# Patient Record
Sex: MALE | Race: WHITE | URBAN - METROPOLITAN AREA
[De-identification: names, ages, dates, MRNs, and addresses within clinical notes are randomized per-mention and may not be internally consistent; named-entity substitution may affect disease eponyms.]

---

## 2017-01-11 ENCOUNTER — TELEPHONE (OUTPATIENT)
Dept: SURGERY | Facility: CLINIC | Age: 47
End: 2017-01-11

## 2017-01-11 NOTE — TELEPHONE ENCOUNTER
Patient requesting a call back from RN, states has questions regarding a surgery he just had in December, would not give any other information, please call.  Thank you

## 2017-01-11 NOTE — TELEPHONE ENCOUNTER
Patient contacted, had a question about \"removal of lipoma of the cord\" during inguinal hernia repair surgery in December. Advised patient that a lipoma is a benign, fatty mass which can increase in size.   Patient verbalized understanding, will call sandip

## 2017-01-23 PROBLEM — M25.522 ELBOW PAIN, LEFT: Status: ACTIVE | Noted: 2017-01-23

## 2017-01-23 PROBLEM — M77.12 LATERAL EPICONDYLITIS OF LEFT ELBOW: Status: ACTIVE | Noted: 2017-01-23

## 2017-05-03 ENCOUNTER — TELEPHONE (OUTPATIENT)
Dept: SURGERY | Facility: CLINIC | Age: 47
End: 2017-05-03

## 2017-05-03 NOTE — TELEPHONE ENCOUNTER
Pt states he had sx in: 12/2016 and is experiencing: possible hernia pain, pain level 3/10 and a lump on incision w/puss and some redness and swelling on incision site. No fever.  Please advise, thank you. (pharmacy confirmed) (unable to connect)

## 2017-05-03 NOTE — TELEPHONE ENCOUNTER
V/O from Dr. Karma Pan ok to refill proair #1 R#5. Rx sent to [facility]. Message left informing pt of this.

## 2017-05-03 NOTE — TELEPHONE ENCOUNTER
Contacted patient. States 2 weeks ago experienced a bump at end of scar, a couple days ago turned into a \"amaral\" and popped, drained yesterday. Also experiencing twinges similar to before procedure in 12/2016. Advised patient Dr. Max Jacobsen should assess.  P

## 2017-05-05 ENCOUNTER — OFFICE VISIT (OUTPATIENT)
Dept: SURGERY | Facility: CLINIC | Age: 47
End: 2017-05-05

## 2017-05-05 DIAGNOSIS — R10.32 GROIN PAIN, LEFT: Primary | ICD-10-CM

## 2017-05-05 DIAGNOSIS — S76.212A GROIN STRAIN, LEFT, INITIAL ENCOUNTER: ICD-10-CM

## 2017-05-05 PROCEDURE — 99212 OFFICE O/P EST SF 10 MIN: CPT | Performed by: SURGERY

## 2017-05-05 PROCEDURE — 99214 OFFICE O/P EST MOD 30 MIN: CPT | Performed by: SURGERY
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
## 2017-05-07 NOTE — PATIENT INSTRUCTIONS
Assessment   Groin pain, left  (primary encounter diagnosis)  Groin strain, left, initial encounter      Plan                 Patient has developed a new pain at the Left Inguinal Area, site of the previous Left Inguinal Hernia Repair.  Was doing well bu up

## 2017-06-06 ENCOUNTER — TELEPHONE (OUTPATIENT)
Dept: SURGERY | Facility: CLINIC | Age: 47
End: 2017-06-06

## 2017-06-29 ENCOUNTER — TELEPHONE (OUTPATIENT)
Dept: SURGERY | Facility: CLINIC | Age: 47
End: 2017-06-29

## 2017-06-29 NOTE — TELEPHONE ENCOUNTER
Pt states he is still experiencing pain after sx on 12/20/16, pt is concerned. Pls advise thank you.

## 2017-06-29 NOTE — TELEPHONE ENCOUNTER
Contacted patient. S/P Repair of LIH with insertion of PreFix Marlex plug and mesh and excision lipoma of the cord on 12/20/2016.  Patient still experiencing pain, states it is better however he needs to fully return to his job that requires strenuous activ

## 2017-06-30 ENCOUNTER — OFFICE VISIT (OUTPATIENT)
Dept: SURGERY | Facility: CLINIC | Age: 47
End: 2017-06-30

## 2017-06-30 VITALS
BODY MASS INDEX: 21.19 KG/M2 | WEIGHT: 135 LBS | SYSTOLIC BLOOD PRESSURE: 110 MMHG | HEIGHT: 67 IN | TEMPERATURE: 98 F | DIASTOLIC BLOOD PRESSURE: 72 MMHG

## 2017-06-30 DIAGNOSIS — R10.32 LEFT GROIN PAIN: Primary | ICD-10-CM

## 2017-06-30 PROCEDURE — 99214 OFFICE O/P EST MOD 30 MIN: CPT | Performed by: SURGERY

## 2017-06-30 PROCEDURE — 99212 OFFICE O/P EST SF 10 MIN: CPT | Performed by: SURGERY

## 2017-07-03 NOTE — PROGRESS NOTES
Follow Up Visit Note       Active Problems   Left groin pain  (primary encounter diagnosis)  Chief Complaint: Patient presents with:  Post-Op: patient had hernia repair in Dec of 2016 , was seen in May for a f'u complaining of pain at the incision site ,le Inguinodynia. Musculoskeletal: He exhibits no edema, tenderness or deformity. Neurological: He is alert and oriented to person, place, and time. He has normal reflexes. He displays normal reflexes. No cranial nerve deficit.  He exhibits normal muscle

## 2017-07-03 NOTE — PATIENT INSTRUCTIONS
Assessment   Left groin pain  (primary encounter diagnosis)      Plan                 Pain/Inguinodynea at Left Inguinal Area, S/P Left Inguinal Hernia Repair December 2016. Pain is not constant. And the area described in a vague form.  Recommended Tylenol

## 2017-08-11 ENCOUNTER — TELEPHONE (OUTPATIENT)
Dept: SURGERY | Facility: CLINIC | Age: 47
End: 2017-08-11

## 2017-08-11 NOTE — TELEPHONE ENCOUNTER
Contacted patient's wife, Adriel Encarnacion. Advised her to alternate motrin and advil, start with 1 tablet/caplet every 6 hours, may increase to 2 if needed. Also instructed to use together with heat/ice-whichever feels better.  She verbalized understanding and will re

## 2017-09-08 RX ORDER — ALBUTEROL SULFATE 90 UG/1
AEROSOL, METERED RESPIRATORY (INHALATION)
Qty: 1 INHALER | Refills: 0 | Status: SHIPPED | OUTPATIENT
Start: 2017-09-08 | End: 2017-11-09

## 2017-09-08 NOTE — TELEPHONE ENCOUNTER
Pt requesting refills for ProAir. Pt requesting 1 refill to go to local pharm, [facility], as he is running out. And then 90 day refills to go to [facility]. Pls call. Thank you.       Current Outpatient Prescriptions:  PROAIR  (90 Base) MCG/ACT I

## 2017-09-08 NOTE — TELEPHONE ENCOUNTER
Notified pt that 1 refill of ProAir was sent to [facility] will sign off on rx for ProAir to go to [facility] shortly. He verbalized understanding.

## 2017-09-09 RX ORDER — ALBUTEROL SULFATE 90 UG/1
AEROSOL, METERED RESPIRATORY (INHALATION)
Qty: 3 INHALER | Refills: 1 | Status: SHIPPED | OUTPATIENT
Start: 2017-09-09 | End: 2017-11-09

## 2017-10-02 PROBLEM — M25.522 LEFT ELBOW PAIN: Status: ACTIVE | Noted: 2017-01-23

## 2017-11-14 ENCOUNTER — TELEPHONE (OUTPATIENT)
Dept: PULMONOLOGY | Facility: CLINIC | Age: 47
End: 2017-11-14

## 2017-11-14 NOTE — TELEPHONE ENCOUNTER
Pt is requesting an Appt sooner than first available due to surgical clearance for elbow surgery.  Please call [phone] thank you

## 2017-11-15 NOTE — TELEPHONE ENCOUNTER
Appt made for tomorrow 10:00. Pt notified of time date and place of appt. Pt verbalized understanding.

## 2017-11-16 ENCOUNTER — OFFICE VISIT (OUTPATIENT)
Dept: PULMONOLOGY | Facility: CLINIC | Age: 47
End: 2017-11-16

## 2017-11-16 VITALS
HEART RATE: 60 BPM | DIASTOLIC BLOOD PRESSURE: 73 MMHG | SYSTOLIC BLOOD PRESSURE: 112 MMHG | WEIGHT: 146 LBS | BODY MASS INDEX: 23 KG/M2 | OXYGEN SATURATION: 98 % | RESPIRATION RATE: 16 BRPM

## 2017-11-16 DIAGNOSIS — M77.12 LATERAL EPICONDYLITIS OF LEFT ELBOW: Primary | ICD-10-CM

## 2017-11-16 PROCEDURE — 99213 OFFICE O/P EST LOW 20 MIN: CPT | Performed by: INTERNAL MEDICINE

## 2017-11-16 PROCEDURE — 99212 OFFICE O/P EST SF 10 MIN: CPT | Performed by: INTERNAL MEDICINE

## 2017-11-16 RX ORDER — ALBUTEROL SULFATE 90 UG/1
AEROSOL, METERED RESPIRATORY (INHALATION)
Qty: 3 INHALER | Refills: 3 | Status: SHIPPED | OUTPATIENT
Start: 2017-11-16 | End: 2018-04-12

## 2017-11-16 NOTE — PROGRESS NOTES
The patient is 25-year-old male comes who comes in now seeking clearance for a left elbow arthroscopy. Patient has ongoing discomfort at the site. He continues to smoke but otherwise is doing well.   He tells me that he had a negative cardiac evaluation a

## 2018-02-15 PROBLEM — M23.91 INTERNAL DERANGEMENT OF MULTIPLE SITES OF RIGHT KNEE: Status: ACTIVE | Noted: 2018-02-15

## 2018-02-15 PROBLEM — M23.92 INTERNAL DERANGEMENT OF LEFT KNEE: Status: ACTIVE | Noted: 2018-02-15

## 2018-10-09 NOTE — TELEPHONE ENCOUNTER
Current Outpatient Medications:  PROAIR  (90 Base) MCG/ACT Inhalation Aero Soln INHALE ONE PUFF BY MOUTH EVERY FOUR TO SIX HOURS AS NEEDED Disp: 25.5 g Rfl: 1     Refill

## 2018-10-10 RX ORDER — ALBUTEROL SULFATE 90 UG/1
AEROSOL, METERED RESPIRATORY (INHALATION)
Qty: 25.5 G | Refills: 1 | Status: SHIPPED | OUTPATIENT
Start: 2018-10-10 | End: 2019-01-09

## 2019-01-09 RX ORDER — ALBUTEROL SULFATE 90 UG/1
AEROSOL, METERED RESPIRATORY (INHALATION)
Qty: 25.5 G | Refills: 1 | Status: SHIPPED | OUTPATIENT
Start: 2019-01-09 | End: 2019-09-15

## 2019-01-09 NOTE — TELEPHONE ENCOUNTER
Current Outpatient Medications:  Albuterol Sulfate HFA (PROAIR HFA) 108 (90 Base) MCG/ACT Inhalation Aero Soln INHALE ONE PUFF BY MOUTH EVERY FOUR TO SIX HOURS AS NEEDED Disp: 25.5 g Rfl: 1     REFILL

## 2019-09-16 RX ORDER — ALBUTEROL SULFATE 90 UG/1
AEROSOL, METERED RESPIRATORY (INHALATION)
Qty: 25.5 G | Refills: 3 | Status: SHIPPED | OUTPATIENT
Start: 2019-09-16 | End: 2020-03-06

## 2020-03-06 RX ORDER — ALBUTEROL SULFATE 90 UG/1
AEROSOL, METERED RESPIRATORY (INHALATION)
Qty: 25.5 G | Refills: 3 | Status: SHIPPED | OUTPATIENT
Start: 2020-03-06 | End: 2020-09-11

## 2020-03-06 NOTE — TELEPHONE ENCOUNTER
Current Outpatient Medications   Medication Sig Dispense Refill   • Albuterol Sulfate HFA (PROAIR HFA) 108 (90 Base) MCG/ACT Inhalation Aero Soln USE 1 INHALATION EVERY 4 TO 6 HOURS AS NEEDED 25.5 g 3

## 2020-03-06 NOTE — TELEPHONE ENCOUNTER
LR 9/16/19  LOV 11/16/17  Pt needs f/u appt.   Lindsay Smith is aware that we received request for refill of albuterol sulfate HFA, last appt w/ MD 11/16/17, he should make f/u appt, & refill request for albuterol sulfate HFA will be sent to MD. He accepted appt

## 2020-03-07 NOTE — TELEPHONE ENCOUNTER
Telephone rx w/ readback rcvd from Dr. Deandra Paige to refill pt's albuterol sulfate  mcg/act inh aero soln. Rx sent to pharmacy.

## 2020-09-02 ENCOUNTER — TELEPHONE (OUTPATIENT)
Dept: PULMONOLOGY | Facility: CLINIC | Age: 50
End: 2020-09-02

## 2020-09-02 RX ORDER — MELOXICAM 15 MG/1
15 TABLET ORAL
Qty: 30 TABLET | Refills: 3 | Status: SHIPPED | OUTPATIENT
Start: 2020-09-02 | End: 2021-06-21

## 2020-09-02 NOTE — TELEPHONE ENCOUNTER
LOV 11/16/17, cancelled appt 9/3/20. Requesting meloxicam refill. Dr. Ada Martinez, please advise. 30 day supply pended.

## 2020-09-02 NOTE — TELEPHONE ENCOUNTER
Pt had to cancel appt for tomorrow - wife states he cannot reschedule now due to work schedule - asking if pt can get refill of Meloxicam

## 2020-09-11 RX ORDER — ALBUTEROL SULFATE 90 UG/1
AEROSOL, METERED RESPIRATORY (INHALATION)
Qty: 25.5 G | Refills: 3 | Status: SHIPPED | OUTPATIENT
Start: 2020-09-11 | End: 2021-09-07

## 2020-09-11 NOTE — TELEPHONE ENCOUNTER
LOV 11/16/2017  Last refill 03/06/2020   Routed to [facility] for sign off

## 2021-06-18 NOTE — TELEPHONE ENCOUNTER
Budd Clonts checking status on refill request.  Please call - patient is completely out of meds. Thank you.

## 2021-06-21 RX ORDER — MELOXICAM 15 MG/1
TABLET ORAL
Qty: 30 TABLET | Refills: 2 | Status: SHIPPED | OUTPATIENT
Start: 2021-06-21 | End: 2021-10-13

## 2021-06-21 NOTE — TELEPHONE ENCOUNTER
LOV 11/16/17  Spoke with patient's wife, says he's working nights until November so can't come to appt.  Explained that patient needs appt for refills, scheduled appt for 11/4/21

## 2021-09-05 ENCOUNTER — TELEPHONE (OUTPATIENT)
Dept: PULMONOLOGY | Facility: CLINIC | Age: 51
End: 2021-09-05

## 2021-09-07 RX ORDER — ALBUTEROL SULFATE 90 UG/1
AEROSOL, METERED RESPIRATORY (INHALATION)
Qty: 1 EACH | Refills: 1 | Status: SHIPPED | OUTPATIENT
Start: 2021-09-07 | End: 2021-09-13

## 2021-09-07 NOTE — TELEPHONE ENCOUNTER
Wife called in to follow up on the refill request. Pt is out of the medication.  She is requesting for one of the medication to be sent to  [address] [place] [address]/[facility], [address] [place] [address] Selvin Real [phone], [phone] and the remaining medication sent t

## 2021-09-07 NOTE — TELEPHONE ENCOUNTER
Last refill 9/11/20  Last office visit 11/16/17    Dr. Andrea Nowak- Please review/sign pended refill request.

## 2021-09-07 NOTE — TELEPHONE ENCOUNTER
Attempted to speak to patient. Wife states patient is sleeping. Informed wife KALI not on file. Wife states patient to call back tomorrow.

## 2021-09-10 NOTE — TELEPHONE ENCOUNTER
Spoke with Henrry Simmons at [facility]. Henrry Simmons states patient picked up medication yesterday.

## 2021-09-13 RX ORDER — ALBUTEROL SULFATE 90 UG/1
AEROSOL, METERED RESPIRATORY (INHALATION)
Qty: 25.5 G | Refills: 0 | Status: SHIPPED | OUTPATIENT
Start: 2021-09-13 | End: 2021-12-15

## 2021-09-13 NOTE — TELEPHONE ENCOUNTER
LOV: 2017 (upcoming appointment 11/4/21)  Last refill: 9/7/21    Dr. Sugar Walls review and sign pended prescription if agreeable.

## 2021-10-13 RX ORDER — MELOXICAM 15 MG/1
TABLET ORAL
Qty: 30 TABLET | Refills: 0 | Status: SHIPPED | OUTPATIENT
Start: 2021-10-13 | End: 2021-11-22

## 2021-10-13 NOTE — TELEPHONE ENCOUNTER
LOV: 11/16/17  Upcoming appt: 11/4/21  LR: 6/21/21    Dr. Kentrell Garcia - Please review/sign pended refill

## 2021-11-04 ENCOUNTER — OFFICE VISIT (OUTPATIENT)
Dept: PULMONOLOGY | Facility: CLINIC | Age: 51
End: 2021-11-04
Payer: COMMERCIAL

## 2021-11-04 VITALS
SYSTOLIC BLOOD PRESSURE: 114 MMHG | OXYGEN SATURATION: 97 % | HEIGHT: 67 IN | DIASTOLIC BLOOD PRESSURE: 74 MMHG | BODY MASS INDEX: 22.7 KG/M2 | RESPIRATION RATE: 14 BRPM | HEART RATE: 60 BPM | WEIGHT: 144.63 LBS

## 2021-11-04 DIAGNOSIS — E78.5 DYSLIPIDEMIA: ICD-10-CM

## 2021-11-04 DIAGNOSIS — Z87.891 PERSONAL HISTORY OF TOBACCO USE, PRESENTING HAZARDS TO HEALTH: ICD-10-CM

## 2021-11-04 DIAGNOSIS — M77.12 LATERAL EPICONDYLITIS OF LEFT ELBOW: Primary | ICD-10-CM

## 2021-11-04 DIAGNOSIS — Z12.5 ENCOUNTER FOR PROSTATE CANCER SCREENING: ICD-10-CM

## 2021-11-04 PROCEDURE — 3078F DIAST BP <80 MM HG: CPT | Performed by: INTERNAL MEDICINE

## 2021-11-04 PROCEDURE — 99203 OFFICE O/P NEW LOW 30 MIN: CPT | Performed by: INTERNAL MEDICINE

## 2021-11-04 PROCEDURE — 3008F BODY MASS INDEX DOCD: CPT | Performed by: INTERNAL MEDICINE

## 2021-11-04 PROCEDURE — 3074F SYST BP LT 130 MM HG: CPT | Performed by: INTERNAL MEDICINE

## 2021-11-04 NOTE — PROGRESS NOTES
The patient is a 22-year-old male who Inocencio from prior evaluation comes in now for follow-up. He had surgery on his left elbow for the epicondylitis. He continues to smoke.   He has a 30-pack-year history of tobacco use and qualifies for low-dose CT s

## 2021-11-22 RX ORDER — MELOXICAM 15 MG/1
TABLET ORAL
Qty: 30 TABLET | Refills: 3 | Status: SHIPPED | OUTPATIENT
Start: 2021-11-22

## 2021-11-22 NOTE — TELEPHONE ENCOUNTER
LOV: 11/4/2021  Last refill: 10/13/21    Dr. Alina Casey review and sign pended prescription if agreeable.

## 2021-12-15 RX ORDER — ALBUTEROL SULFATE 90 UG/1
AEROSOL, METERED RESPIRATORY (INHALATION)
Qty: 25.5 G | Refills: 3 | Status: SHIPPED | OUTPATIENT
Start: 2021-12-15

## 2021-12-30 ENCOUNTER — TELEPHONE (OUTPATIENT)
Dept: PULMONOLOGY | Facility: CLINIC | Age: 51
End: 2021-12-30

## 2022-02-02 ENCOUNTER — TELEPHONE (OUTPATIENT)
Dept: PULMONOLOGY | Facility: CLINIC | Age: 52
End: 2022-02-02

## 2022-02-02 NOTE — TELEPHONE ENCOUNTER
Patient is overdue for CT lung low dose screening as ordered by Dr. Liliana Manriquez. First letter sent to patient.

## 2022-04-19 RX ORDER — MELOXICAM 15 MG/1
15 TABLET ORAL
Qty: 30 TABLET | Refills: 2 | Status: SHIPPED | OUTPATIENT
Start: 2022-04-19

## 2022-04-19 NOTE — TELEPHONE ENCOUNTER
Spoke with patient's wife, informed her that Dr. David Chavarria refilled patient's prescription. Wife verbalized understanding.

## 2022-06-03 ENCOUNTER — TELEPHONE (OUTPATIENT)
Dept: PULMONOLOGY | Facility: CLINIC | Age: 52
End: 2022-06-03

## 2022-06-03 RX ORDER — ALBUTEROL SULFATE 90 UG/1
1 AEROSOL, METERED RESPIRATORY (INHALATION) EVERY 4 HOURS PRN
Qty: 25.5 G | Refills: 3 | Status: SHIPPED | OUTPATIENT
Start: 2022-06-03

## 2022-06-03 NOTE — TELEPHONE ENCOUNTER
LOV: 11/04/2021  Last refill: 12/15/2021    Dr. Tena Kim review and sign rescue inhaler order if agreebale.

## 2022-09-06 RX ORDER — MELOXICAM 15 MG/1
15 TABLET ORAL
Qty: 30 TABLET | Refills: 2 | Status: SHIPPED | OUTPATIENT
Start: 2022-09-06

## 2022-12-12 RX ORDER — ALBUTEROL SULFATE 90 UG/1
AEROSOL, METERED RESPIRATORY (INHALATION)
Qty: 25.5 G | Refills: 3 | Status: SHIPPED | OUTPATIENT
Start: 2022-12-12

## 2022-12-12 NOTE — TELEPHONE ENCOUNTER
LOV: 11/4/2021  Last refill: 6/3/2022    Dr. Odilon rBagg review and sign pended prescription if agreeable.

## 2023-01-09 RX ORDER — MELOXICAM 15 MG/1
15 TABLET ORAL
Qty: 30 TABLET | Refills: 1 | Status: SHIPPED | OUTPATIENT
Start: 2023-01-09

## 2023-01-12 ENCOUNTER — OFFICE VISIT (OUTPATIENT)
Dept: PULMONOLOGY | Facility: CLINIC | Age: 53
End: 2023-01-12

## 2023-01-12 VITALS
HEIGHT: 67 IN | DIASTOLIC BLOOD PRESSURE: 84 MMHG | SYSTOLIC BLOOD PRESSURE: 141 MMHG | BODY MASS INDEX: 21.66 KG/M2 | OXYGEN SATURATION: 98 % | WEIGHT: 138 LBS | RESPIRATION RATE: 16 BRPM | HEART RATE: 63 BPM

## 2023-01-12 DIAGNOSIS — Z72.0 TOBACCO USE: Primary | ICD-10-CM

## 2023-01-12 PROCEDURE — 3008F BODY MASS INDEX DOCD: CPT | Performed by: INTERNAL MEDICINE

## 2023-01-12 PROCEDURE — 3079F DIAST BP 80-89 MM HG: CPT | Performed by: INTERNAL MEDICINE

## 2023-01-12 PROCEDURE — 3077F SYST BP >= 140 MM HG: CPT | Performed by: INTERNAL MEDICINE

## 2023-01-12 PROCEDURE — 99213 OFFICE O/P EST LOW 20 MIN: CPT | Performed by: INTERNAL MEDICINE

## 2023-01-12 NOTE — PROGRESS NOTES
The patient is a 75-year-old male who I know well from prior evaluation comes in now for follow-up. He quit smoking 4 days ago. He has not pursued colonoscopy or low-dose CT screening as previously advised but he is willing to get the low-dose CT scan now. His elbows occasionally manifest discomfort. Review of Systems:  Vision normal. Ear nose and throat normal. Bowel normal. Bladder function normal. No depression. No thyroid disease. No lymphatic system concerns. No rash. Muscles and joints unremarkable. No weight loss no weight gain. Physical Examination:  Vital signs normal. HEENT examination is unremarkable with pupils equal round and reactive to light and accommodation. Neck without adenopathy, thyromegaly, JVD nor bruit. Lungs clear to auscultation and percussion. Cardiac regular rate and rhythm no murmur. Abdomen nontender, without hepatosplenomegaly and no mass appreciable. Extremities and Musculoskeletal without clubbing cyanosis nor edema, and mobility acceptable. Neurologic grossly intact with symmetric tone and strength and reflex. Assessment and plan:  1. Tobacco use-quit 4 days ago. Ongoing tobacco abstinence. 2.  Low-dose CT screening program-we will renew order which had   3. Epicondylitis-better  4. General health- the patient should never drink and drive, always wear a safety belt, have a smoke detector and fire extiguisher in the house, avoid the use of candles in the home as they are the most common cause of housefire, and see me annually for complete examination. The patient had not followed up with PSA, colonoscopy, low-dose CT screening and I encouraged him to reconsider.

## 2023-02-15 ENCOUNTER — TELEPHONE (OUTPATIENT)
Dept: PULMONOLOGY | Facility: CLINIC | Age: 53
End: 2023-02-15

## 2023-02-15 DIAGNOSIS — R91.1 PULMONARY NODULE: Primary | ICD-10-CM

## 2023-02-15 NOTE — TELEPHONE ENCOUNTER
[facility]/Central Scheduling states that CT order has . Please update order in Epic - order  on 21.

## 2023-03-01 ENCOUNTER — HOSPITAL ENCOUNTER (OUTPATIENT)
Dept: CT IMAGING | Facility: HOSPITAL | Age: 53
Discharge: HOME | End: 2023-03-01
Attending: INTERNAL MEDICINE
Payer: COMMERCIAL

## 2023-03-01 DIAGNOSIS — R91.1 PULMONARY NODULE: ICD-10-CM

## 2023-03-01 PROCEDURE — 71271 CT THORAX LUNG CANCER SCR C-: CPT | Performed by: INTERNAL MEDICINE

## 2023-03-13 DIAGNOSIS — Z87.891 PERSONAL HISTORY OF TOBACCO USE, PRESENTING HAZARDS TO HEALTH: Primary | ICD-10-CM

## 2023-03-20 RX ORDER — MELOXICAM 15 MG/1
15 TABLET ORAL
Qty: 30 TABLET | Refills: 3 | Status: SHIPPED | OUTPATIENT
Start: 2023-03-20

## 2023-03-20 NOTE — TELEPHONE ENCOUNTER
LOV: 1/12/2023  Last refill: 1/9/2023    Dr. Spencer Hong review and sign pended prescription if agreeable.

## 2023-12-05 RX ORDER — ALBUTEROL SULFATE 90 UG/1
1 AEROSOL, METERED RESPIRATORY (INHALATION) EVERY 4 HOURS PRN
Qty: 25.5 G | Refills: 3 | Status: SHIPPED | OUTPATIENT
Start: 2023-12-05

## 2024-01-15 ENCOUNTER — OFFICE VISIT (OUTPATIENT)
Dept: PULMONOLOGY | Facility: CLINIC | Age: 54
End: 2024-01-15

## 2024-01-15 VITALS
OXYGEN SATURATION: 97 % | DIASTOLIC BLOOD PRESSURE: 78 MMHG | BODY MASS INDEX: 23.14 KG/M2 | SYSTOLIC BLOOD PRESSURE: 150 MMHG | HEIGHT: 66 IN | HEART RATE: 57 BPM | WEIGHT: 144 LBS

## 2024-01-15 DIAGNOSIS — Z72.0 TOBACCO USE: Primary | ICD-10-CM

## 2024-01-15 PROCEDURE — 3008F BODY MASS INDEX DOCD: CPT | Performed by: INTERNAL MEDICINE

## 2024-01-15 PROCEDURE — 3078F DIAST BP <80 MM HG: CPT | Performed by: INTERNAL MEDICINE

## 2024-01-15 PROCEDURE — 3077F SYST BP >= 140 MM HG: CPT | Performed by: INTERNAL MEDICINE

## 2024-01-15 PROCEDURE — 99213 OFFICE O/P EST LOW 20 MIN: CPT | Performed by: INTERNAL MEDICINE

## 2024-01-15 RX ORDER — ALBUTEROL SULFATE 90 UG/1
1 AEROSOL, METERED RESPIRATORY (INHALATION) EVERY 4 HOURS PRN
Qty: 25.5 G | Refills: 3 | Status: SHIPPED | OUTPATIENT
Start: 2024-01-15

## 2024-01-15 RX ORDER — MELOXICAM 15 MG/1
15 TABLET ORAL
Qty: 30 TABLET | Refills: 3 | Status: SHIPPED | OUTPATIENT
Start: 2024-01-15

## 2024-01-15 NOTE — PROGRESS NOTES
The patient is a 53-year-old male who I know well from prior evaluation comes in now for follow-up.  He has a small nonpainful nonerythematous nodule on the shaft of his penis.  It does not hurt with intercourse.  He notes that it does not bother him but his wife wants him to get it taken off.    Review of Systems:  Vision normal. Ear nose and throat normal. Bowel normal. Bladder function normal. No depression. No thyroid disease. No lymphatic system concerns.  No rash. Muscles and joints unremarkable. No weight loss no weight gain.    Physical Examination:  Vital signs normal. HEENT examination is unremarkable with pupils equal round and reactive to light and accommodation. Neck without adenopathy, thyromegaly, JVD nor bruit. Lungs clear to auscultation and percussion. Cardiac regular rate and rhythm no murmur. Abdomen nontender, without hepatosplenomegaly and no mass appreciable. Extremities and Musculoskeletal without clubbing cyanosis nor edema, and mobility acceptable. Neurologic grossly intact with symmetric tone and strength and reflex.  The patient has a firm slightly mobile 1 cm nontender nodule which may be a sebaceous cyst on the shaft of his left penis.    Assessment and plan:  1.  Cyst on shaft of penis-patient to follow-up with urology    2.  Tobacco use-again advised to quit as he had previously    3.  History of epicondylitis-resolved    4.  General health-the patient should never drink and drive, always wear a safety belt, have a smoke detector and fire extiguisher in the house, avoid the use of candles in the home as they are the most common cause of housefire, and see me annually for complete examination.  The patient does not want to get PSA, lipids nor colonoscopy.

## 2024-04-10 ENCOUNTER — TELEPHONE (OUTPATIENT)
Dept: PULMONOLOGY | Facility: CLINIC | Age: 54
End: 2024-04-10

## 2024-04-10 NOTE — TELEPHONE ENCOUNTER
Patient states that he is having possible hernia pain and wants to be referred to see a specialist.

## 2024-04-10 NOTE — TELEPHONE ENCOUNTER
Spoke with patient states he previously had left inguinal hernia, now has dull pain during exhalation, sitting and when he pushes on area.  Denies any bulging in area and discoloration, rates pain as 2/10.

## 2024-04-23 ENCOUNTER — OFFICE VISIT (OUTPATIENT)
Dept: SURGERY | Facility: CLINIC | Age: 54
End: 2024-04-23

## 2024-04-23 DIAGNOSIS — R10.32 LEFT GROIN PAIN: Primary | ICD-10-CM

## 2024-04-23 PROCEDURE — 99204 OFFICE O/P NEW MOD 45 MIN: CPT | Performed by: SURGERY

## 2024-04-23 NOTE — H&P
HPI:    Patient ID: Cem Capellan is a 53 year old male presenting with No chief complaint on file.  .    HPI    Past Medical History:    Extrinsic asthma, unspecified    Fracture of right radius and ulna    Injury of knee, ligament    partial torn ligament in right knee     Past Surgical History:   Procedure Laterality Date    Elbow surgery Left 01/2018    tendonitis repair    Fracture surgery Right 1990    ARM FRACTURE REPAIR WITH PLATES    Inguinal hernia repair Left 12/20/2016    Repair of left inguinal hernia with insertion of PreFix Marlex plug and mesh and excision of lipoma of the cord     Family History   Problem Relation Age of Onset    Asthma Father     Heart Disease Father         CAD    Migraines Mother      Social History     Socioeconomic History    Marital status:      Spouse name: Not on file    Number of children: Not on file    Years of education: Not on file    Highest education level: Not on file   Occupational History    Not on file   Tobacco Use    Smoking status: Every Day    Smokeless tobacco: Never   Substance and Sexual Activity    Alcohol use: Yes     Comment: beer & wine > 5 glasses QOD    Drug use: Not on file    Sexual activity: Not on file   Other Topics Concern     Service Not Asked    Blood Transfusions Not Asked    Caffeine Concern No    Occupational Exposure Not Asked    Hobby Hazards Not Asked    Sleep Concern Not Asked    Stress Concern Not Asked    Weight Concern Not Asked    Special Diet Not Asked    Back Care Not Asked    Exercise Not Asked    Bike Helmet Not Asked    Seat Belt Not Asked    Self-Exams Not Asked   Social History Narrative    Not on file     Social Determinants of Health     Financial Resource Strain: Not on file   Food Insecurity: Not on file   Transportation Needs: Not on file   Physical Activity: Not on file   Stress: Not on file   Social Connections: Not on file   Housing Stability: Not on file       Review of Systems   Constitutional:  Negative.    HENT: Negative.     Eyes: Negative.    Respiratory: Negative.     Cardiovascular: Negative.    Gastrointestinal: Negative.         Left groin pain without bulge   Endocrine: Negative.    Genitourinary: Negative.    Musculoskeletal: Negative.    Skin: Negative.    Allergic/Immunologic: Negative.    Neurological: Negative.    Hematological:  Does not bruise/bleed easily.   Psychiatric/Behavioral: Negative.             Current Outpatient Medications   Medication Sig Dispense Refill    albuterol 108 (90 Base) MCG/ACT Inhalation Aero Soln Inhale 1 puff into the lungs every 4 (four) hours as needed for Wheezing. 25.5 g 3    Meloxicam 15 MG Oral Tab Take 1 tablet (15 mg total) by mouth daily with food. 30 tablet 3    Meloxicam 15 MG Oral Tab Take 1 tablet (15 mg total) by mouth daily. (Patient not taking: Reported on 1/15/2024) 90 tablet 3    Meloxicam (MOBIC) 15 MG Oral Tab Take 1 tablet (15 mg total) by mouth daily with food. (Patient not taking: Reported on 1/15/2024) 35 tablet 3       Allergies:No Known Allergies   PHYSICAL EXAM:   There were no vitals taken for this visit.  Physical Exam  Vitals reviewed.   Constitutional:       Appearance: Normal appearance. He is well-developed.   HENT:      Head: Normocephalic and atraumatic.   Cardiovascular:      Rate and Rhythm: Normal rate and regular rhythm.   Pulmonary:      Effort: Pulmonary effort is normal.      Breath sounds: Normal breath sounds.   Abdominal:      General: There is no distension.      Palpations: Abdomen is soft. There is no mass.      Tenderness: There is no abdominal tenderness. There is no guarding or rebound.      Comments: Healed left groin scar.  No discomfort to palpation and no bulge.   Musculoskeletal:         General: Normal range of motion.      Cervical back: Normal range of motion and neck supple.   Skin:     General: Skin is warm and dry.   Neurological:      Mental Status: He is alert and oriented to person, place, and time.    Psychiatric:         Speech: Speech normal.         Behavior: Behavior normal.                 ASSESSMENT/PLAN:   Diagnoses and all orders for this visit:    Left groin pain  -     CT PELVIS (CPT=72192); Future    No evidence of hernia recurrence on physical exam.  Will obtain CT of the pelvis to evaluate for small occult recurrent inguinal hernia.  If one is present then will discuss the option for hernia repair.  If no hernia is seen on imaging then pt is reassured.           Werner Winters MD  4/23/2024

## 2024-05-16 ENCOUNTER — HOSPITAL ENCOUNTER (OUTPATIENT)
Dept: CT IMAGING | Facility: HOSPITAL | Age: 54
Discharge: HOME | End: 2024-05-16
Attending: INTERNAL MEDICINE

## 2024-05-16 ENCOUNTER — HOSPITAL ENCOUNTER (OUTPATIENT)
Dept: CT IMAGING | Facility: HOSPITAL | Age: 54
Discharge: HOME | End: 2024-05-16
Attending: SURGERY

## 2024-05-16 DIAGNOSIS — R10.32 LEFT GROIN PAIN: ICD-10-CM

## 2024-05-16 DIAGNOSIS — Z87.891 PERSONAL HISTORY OF TOBACCO USE, PRESENTING HAZARDS TO HEALTH: ICD-10-CM

## 2024-05-16 PROCEDURE — 72192 CT PELVIS W/O DYE: CPT | Performed by: SURGERY

## 2024-05-16 PROCEDURE — 71271 CT THORAX LUNG CANCER SCR C-: CPT | Performed by: INTERNAL MEDICINE

## 2024-05-21 ENCOUNTER — TELEPHONE (OUTPATIENT)
Dept: SURGERY | Facility: CLINIC | Age: 54
End: 2024-05-21

## 2024-05-21 NOTE — TELEPHONE ENCOUNTER
Left message Results reviewed. Tests show no evidence of hernia recurrence. Please inform patient. Per Dr. Winters

## 2024-05-23 ENCOUNTER — TELEPHONE (OUTPATIENT)
Dept: PULMONOLOGY | Facility: CLINIC | Age: 54
End: 2024-05-23

## 2024-05-23 NOTE — TELEPHONE ENCOUNTER
----- Message from Renaldo Gerard sent at 5/21/2024 10:17 PM CDT -----  RN, please call the patient to let him know that his CT scan of the chest demonstrates stability of the tiny pulmonary nodule.  Add to the calendar a repeat low-dose CT scan of the chest at the 1 year interval.  New PALACIOS

## 2024-05-30 NOTE — TELEPHONE ENCOUNTER
Patient did not read [employer] result note sent by Dr. Gerard, unable to reach via phone, no response letter mailed to patient at address on file.

## 2024-05-31 ENCOUNTER — TELEPHONE (OUTPATIENT)
Dept: PULMONOLOGY | Facility: CLINIC | Age: 54
End: 2024-05-31

## 2024-06-05 NOTE — TELEPHONE ENCOUNTER
Spoke to patient and informed of LD lung screening results. Reflex order placed in chronic calendar.

## 2025-01-16 ENCOUNTER — OFFICE VISIT (OUTPATIENT)
Dept: PULMONOLOGY | Facility: CLINIC | Age: 55
End: 2025-01-16
Payer: COMMERCIAL

## 2025-01-16 VITALS
WEIGHT: 138.38 LBS | HEIGHT: 66 IN | SYSTOLIC BLOOD PRESSURE: 133 MMHG | OXYGEN SATURATION: 100 % | RESPIRATION RATE: 12 BRPM | HEART RATE: 53 BPM | DIASTOLIC BLOOD PRESSURE: 91 MMHG | BODY MASS INDEX: 22.24 KG/M2

## 2025-01-16 DIAGNOSIS — Z72.0 TOBACCO ABUSE: Primary | ICD-10-CM

## 2025-01-16 DIAGNOSIS — M17.10 ARTHRITIS OF KNEE: ICD-10-CM

## 2025-01-16 DIAGNOSIS — Z87.891 PERSONAL HISTORY OF TOBACCO USE, PRESENTING HAZARDS TO HEALTH: ICD-10-CM

## 2025-01-16 DIAGNOSIS — Z91.030 BEE STING ALLERGY: ICD-10-CM

## 2025-01-16 PROCEDURE — 99213 OFFICE O/P EST LOW 20 MIN: CPT | Performed by: INTERNAL MEDICINE

## 2025-01-16 PROCEDURE — 3008F BODY MASS INDEX DOCD: CPT | Performed by: INTERNAL MEDICINE

## 2025-01-16 PROCEDURE — 3075F SYST BP GE 130 - 139MM HG: CPT | Performed by: INTERNAL MEDICINE

## 2025-01-16 PROCEDURE — 3080F DIAST BP >= 90 MM HG: CPT | Performed by: INTERNAL MEDICINE

## 2025-01-16 RX ORDER — EPINEPHRINE 0.3 MG/.3ML
0.3 INJECTION SUBCUTANEOUS ONCE
Status: SHIPPED | OUTPATIENT
Start: 2025-01-16

## 2025-01-16 RX ORDER — ALBUTEROL SULFATE 90 UG/1
1 INHALANT RESPIRATORY (INHALATION) EVERY 4 HOURS PRN
Qty: 25.5 G | Refills: 3 | Status: SHIPPED | OUTPATIENT
Start: 2025-01-16

## 2025-01-16 NOTE — PROGRESS NOTES
Patient provided with printed DME order for ProKnee knee pads.     CT LUNG LD SCREENING due 5/2025 added to chronic calendar

## 2025-01-16 NOTE — PROGRESS NOTES
The patient is a 54-year-old male who I know well from prior evaluation who comes in now for follow-up.  He continues to smoke 1 pack/day and tells me that he is not ready to quit.  Otherwise, he has ongoing arthritic knees and needs a prescription for kneepads.  He had been evaluated by urology.    Review of Systems:  Vision normal. Ear nose and throat normal. Bowel normal. Bladder function normal. No depression. No thyroid disease. No lymphatic system concerns.  No rash. Muscles and joints unremarkable. No weight loss no weight gain.    Physical Examination:  Vital signs normal. HEENT examination is unremarkable with pupils equal round and reactive to light and accommodation. Neck without adenopathy, thyromegaly, JVD nor bruit. Lungs clear to auscultation and percussion. Cardiac regular rate and rhythm no murmur. Abdomen nontender, without hepatosplenomegaly and no mass appreciable. Extremities and Musculoskeletal without clubbing cyanosis nor edema, and mobility acceptable. Neurologic grossly intact with symmetric tone and strength and reflex.    Assessment and plan:  1.  Cyst on the shaft of the penis-had been evaluated by urology.  No further evaluation at this moment.    2.  History of epicondylitis-resolved    3.  Ongoing tobacco use-again patient has been advised to quit smoking and we discussed the benefits of quitting although he is not interested at this moment.    4.  General Health-the patient should never drink and drive, always wear a safety belt, have a smoke detector and fire extiguisher in the house, avoid the use of candles in the home as they are the most common cause of housefire, and see me annually for complete examination.  The patient declines PSA and colonoscopy and had previously declined lipids And again today.    5.  Low-dose CT screening program-patient is due for his next test in May.         Lung Cancer Counseling and Shared Decision Making Session in an Asymptomatic Smoker/Former  Smoker   Cem Capellan is a 54 year old male without current symptoms of lung cancer.  History   Smoking Status    Every Day    Types: Cigarettes   Smokeless Tobacco    Never        He received information on the importance of adherence to annual lung cancer Low Dose CT (LDCT) screening, the impact of his comorbidities and his ability or willingness to undergo diagnosis and treatment. he is in agreement and an order will be placed for CT LUNG LD SCREENING(CPT=71271).    We counseled the importance of maintaining cigarette smoking abstinence if he is a former smoker and the importance of smoking cessation if he is a current smoker and we discussed and furnished information about tobacco cessation interventions.

## 2025-07-29 RX ORDER — ALBUTEROL SULFATE 90 UG/1
1 INHALANT RESPIRATORY (INHALATION) EVERY 4 HOURS PRN
Qty: 25.5 G | Refills: 3 | Status: SHIPPED | OUTPATIENT
Start: 2025-07-29

## 2025-08-15 ENCOUNTER — TELEPHONE (OUTPATIENT)
Dept: PULMONOLOGY | Facility: CLINIC | Age: 55
End: 2025-08-15

## (undated) NOTE — LETTER
5/30/2024        Cem Capellan  [address] [facility] [address]         Dear Cem,      This letter is to inform you that our office has made several attempts to reach you by phone without success.  We were attempting to contact you by phone regarding your CT Lung Screening results.    Please contact our office at the number listed below as soon as you receive this letter to discuss this issue and to make the necessary changes in our system to your contact information.  Thank you for your cooperation.        Sincerely,    Renaldo Gerard MD  [facility], [facility]  [address]  [facility] [id number] [phone]

## (undated) NOTE — LETTER
2/2/2022              [address] [place] [address]         Dear Madan Collins records indicate that the tests ordered for you by Gagandeep Willis MD  have not been done.   If you have, in fact, already completed the

## (undated) NOTE — Clinical Note
5/5/2017          To Whom It May Concern:    Sabine Ramos is currently under my medical care.   Activity is restricted as follows: Light duty including no heavy lifting over 15 pounds and no strenuous activity such as bending, twisting, and prolonged dimple

## (undated) NOTE — MR AVS SNAPSHOT
[facility]  [address]  [phone]               Thank you for choosing us for your health care visit with Leanna Walsh MD.  We are glad to serve you and happy to provide you with this summa This list is accurate as of: 5/5/17 11:59 PM.  Always use your most recent med list.                * Albuterol Sulfate  (90 Base) MCG/ACT Aers   INHALE 1 PUFF EVERY 4 TO 6 HOURS AS NEEDED.    Commonly known as:  PROAIR HFA           * PROAIR